# Patient Record
Sex: FEMALE | URBAN - METROPOLITAN AREA
[De-identification: names, ages, dates, MRNs, and addresses within clinical notes are randomized per-mention and may not be internally consistent; named-entity substitution may affect disease eponyms.]

---

## 2021-07-14 ENCOUNTER — OFFICE VISIT (OUTPATIENT)
Dept: URBAN - METROPOLITAN AREA CLINIC 29 | Facility: CLINIC | Age: 71
End: 2021-07-14
Payer: MEDICARE

## 2021-07-14 DIAGNOSIS — H43.813 VITREOUS DEGENERATION, BILATERAL: Primary | ICD-10-CM

## 2021-07-14 DIAGNOSIS — H25.13 AGE-RELATED NUCLEAR CATARACT, BILATERAL: ICD-10-CM

## 2021-07-14 PROCEDURE — 92004 COMPRE OPH EXAM NEW PT 1/>: CPT | Performed by: OPTOMETRIST

## 2021-07-14 ASSESSMENT — INTRAOCULAR PRESSURE
OD: 14
OS: 15

## 2021-07-14 NOTE — IMPRESSION/PLAN
Impression: Age-related nuclear cataract, bilateral: H25.13. Plan: Discussed diagnosis in detail with patient. No treatment is required at this time. Will continue to observe condition and or symptoms. Reassured patient of current condition and treatment. Call if Symptoms occur.

## 2022-07-14 ENCOUNTER — OFFICE VISIT (OUTPATIENT)
Dept: URBAN - METROPOLITAN AREA CLINIC 16 | Facility: CLINIC | Age: 72
End: 2022-07-14
Payer: MEDICARE

## 2022-07-14 DIAGNOSIS — H25.13 AGE-RELATED NUCLEAR CATARACT, BILATERAL: Primary | ICD-10-CM

## 2022-07-14 DIAGNOSIS — H43.813 VITREOUS DEGENERATION, BILATERAL: ICD-10-CM

## 2022-07-14 PROCEDURE — 99214 OFFICE O/P EST MOD 30 MIN: CPT | Performed by: OPTOMETRIST

## 2022-07-14 ASSESSMENT — INTRAOCULAR PRESSURE
OS: 16
OD: 16

## 2022-07-14 ASSESSMENT — KERATOMETRY
OS: 46.50
OD: 46.00

## 2022-07-29 ENCOUNTER — OFFICE VISIT (OUTPATIENT)
Dept: URBAN - METROPOLITAN AREA CLINIC 16 | Facility: CLINIC | Age: 72
End: 2022-07-29
Payer: MEDICARE

## 2022-07-29 DIAGNOSIS — H25.813 COMBINED FORMS OF AGE-RELATED CATARACT, BILATERAL: Primary | ICD-10-CM

## 2022-07-29 PROCEDURE — 99204 OFFICE O/P NEW MOD 45 MIN: CPT | Performed by: OPHTHALMOLOGY

## 2022-07-29 ASSESSMENT — KERATOMETRY
OS: 47.00
OD: 46.00

## 2022-07-29 ASSESSMENT — VISUAL ACUITY
OD: 20/25
OS: 20/25

## 2022-07-29 ASSESSMENT — INTRAOCULAR PRESSURE
OD: 16
OS: 16

## 2022-07-29 NOTE — IMPRESSION/PLAN
Impression: Combined forms of age-related cataract, bilateral: H25.813. .  Visually significant, quality of life issue, could improve with surgery. Plan: Cataracts account for the patient's complaints. Discussed all risks, benefits, alternatives, procedures and recovery. Patient understands changing glasses will not improve vision. Patient desires to have surgery, recommend phacoemulsification with intraocular lens implant OS. lvl 2 - pt declines premium IOL -  AIM -2.25 OU. Re-evaluate OD for possible cataract surgery after OS is done. Not OK for Dexcyu. Pt. knows anisometropia may be an issue after 1st eye.

## 2022-09-14 ENCOUNTER — PRE-OPERATIVE VISIT (OUTPATIENT)
Dept: URBAN - METROPOLITAN AREA CLINIC 16 | Facility: CLINIC | Age: 72
End: 2022-09-14
Payer: MEDICARE

## 2022-09-14 DIAGNOSIS — H25.813 COMBINED FORMS OF AGE-RELATED CATARACT, BILATERAL: Primary | ICD-10-CM

## 2022-09-14 ASSESSMENT — PACHYMETRY
OS: 24.48
OS: 3.15
OD: 3.26
OD: 24.32

## 2022-09-26 ENCOUNTER — SURGERY (OUTPATIENT)
Dept: URBAN - METROPOLITAN AREA SURGERY 11 | Facility: SURGERY | Age: 72
End: 2022-09-26
Payer: MEDICARE

## 2022-09-26 PROCEDURE — 66984 XCAPSL CTRC RMVL W/O ECP: CPT | Performed by: OPHTHALMOLOGY

## 2022-09-27 ENCOUNTER — POST-OPERATIVE VISIT (OUTPATIENT)
Dept: URBAN - METROPOLITAN AREA CLINIC 16 | Facility: CLINIC | Age: 72
End: 2022-09-27
Payer: MEDICARE

## 2022-09-27 DIAGNOSIS — Z48.810 ENCOUNTER FOR SURGICAL AFTERCARE FOLLOWING SURGERY ON A SENSE ORGAN: Primary | ICD-10-CM

## 2022-09-27 PROCEDURE — 99024 POSTOP FOLLOW-UP VISIT: CPT | Performed by: OPTOMETRIST

## 2022-09-27 ASSESSMENT — INTRAOCULAR PRESSURE
OD: 14
OS: 14

## 2022-09-27 NOTE — IMPRESSION/PLAN
Impression: S/P Cataract Extraction by phacoemulsification with IOL placement OS - 1 Day. Encounter for surgical aftercare following surgery on a sense organ  Z48.020.  Post operative instructions reviewed - Plan: --Continue Ofloxacin 0.3%--Taper Pred-Ketor as directed

## 2022-10-07 ENCOUNTER — OFFICE VISIT (OUTPATIENT)
Dept: URBAN - METROPOLITAN AREA CLINIC 16 | Facility: CLINIC | Age: 72
End: 2022-10-07
Payer: MEDICARE

## 2022-10-07 DIAGNOSIS — H25.11 AGE-RELATED NUCLEAR CATARACT, RIGHT EYE: Primary | ICD-10-CM

## 2022-10-07 DIAGNOSIS — Z96.1 PRESENCE OF INTRAOCULAR LENS: ICD-10-CM

## 2022-10-07 PROCEDURE — 99213 OFFICE O/P EST LOW 20 MIN: CPT | Performed by: OPHTHALMOLOGY

## 2022-10-07 ASSESSMENT — VISUAL ACUITY: OD: 20/25

## 2022-10-07 ASSESSMENT — INTRAOCULAR PRESSURE
OD: 13
OS: 12

## 2022-10-07 NOTE — IMPRESSION/PLAN
Impression: Age-related nuclear cataract, right eye: H25.11. [place] Visually significant, quality of life issue, could improve with surgery. Plan: Cataracts account for the patient's complaints. Discussed all risks, benefits, alternatives, procedures and recovery. Patient understands changing glasses will not improve vision. Patient desires to have surgery, recommend phacoemulsification with intraocular lens implant OD.  lvl 2 - pt declines premium IOL - AIM NEAR.

## 2022-10-10 ENCOUNTER — SURGERY (OUTPATIENT)
Dept: URBAN - METROPOLITAN AREA SURGERY 11 | Facility: SURGERY | Age: 72
End: 2022-10-10
Payer: MEDICARE

## 2022-10-10 PROCEDURE — 66984 XCAPSL CTRC RMVL W/O ECP: CPT | Performed by: OPHTHALMOLOGY

## 2022-10-11 ENCOUNTER — POST-OPERATIVE VISIT (OUTPATIENT)
Dept: URBAN - METROPOLITAN AREA CLINIC 16 | Facility: CLINIC | Age: 72
End: 2022-10-11
Payer: MEDICARE

## 2022-10-11 DIAGNOSIS — Z96.1 PRESENCE OF INTRAOCULAR LENS: Primary | ICD-10-CM

## 2022-10-11 PROCEDURE — 99024 POSTOP FOLLOW-UP VISIT: CPT | Performed by: OPTOMETRIST

## 2022-10-11 ASSESSMENT — INTRAOCULAR PRESSURE
OD: 14
OS: 12

## 2022-10-18 ENCOUNTER — POST-OPERATIVE VISIT (OUTPATIENT)
Dept: URBAN - METROPOLITAN AREA CLINIC 16 | Facility: CLINIC | Age: 72
End: 2022-10-18

## 2022-10-18 DIAGNOSIS — Z96.1 PRESENCE OF INTRAOCULAR LENS: Primary | ICD-10-CM

## 2022-10-18 PROCEDURE — 99024 POSTOP FOLLOW-UP VISIT: CPT | Performed by: OPTOMETRIST

## 2022-10-18 ASSESSMENT — INTRAOCULAR PRESSURE
OD: 11
OS: 12

## 2022-10-18 NOTE — IMPRESSION/PLAN
Impression: S/P Cataract Extraction by phacoemulsification with IOL placement OD - 8 Days. Presence of intraocular lens  Z96.1.  Post operative instructions reviewed - Plan: --Discontinue Ofloxacin 0.3%--Taper Pred-Ketor as directed
Patient denies tobacco use, alcohol use, or any illicit drug use. Lives at home with her mother.

## 2022-11-14 ENCOUNTER — POST-OPERATIVE VISIT (OUTPATIENT)
Dept: URBAN - METROPOLITAN AREA CLINIC 16 | Facility: CLINIC | Age: 72
End: 2022-11-14
Payer: MEDICARE

## 2022-11-14 DIAGNOSIS — Z96.1 PRESENCE OF INTRAOCULAR LENS: Primary | ICD-10-CM

## 2022-11-14 PROCEDURE — 99024 POSTOP FOLLOW-UP VISIT: CPT | Performed by: OPTOMETRIST

## 2022-11-14 ASSESSMENT — INTRAOCULAR PRESSURE
OD: 11
OS: 11

## 2022-11-14 NOTE — IMPRESSION/PLAN
Impression: S/P Cataract Extraction by phacoemulsification with IOL placement OD - 35 Days. Presence of intraocular lens  Z96.1. Excellent post op course Plan: Patient will be returning to Dr. Almita Clark for further care. --Advised patient to use artificial tears for comfort.